# Patient Record
(demographics unavailable — no encounter records)

---

## 2024-12-13 NOTE — DISCUSSION/SUMMARY
[FreeTextEntry1] : Level 2 sonogram with cervical length at 20 weeks.   S/Sx of PTL reviewed.   No other interventions are indicated at this time.

## 2024-12-13 NOTE — DATA REVIEWED
[FreeTextEntry1] : Tracie presents to discuss her prior OB history and any implications for this pregnancy.   We discussed that a prior  delivery increases the incidence of a  birth in a subsequent pregnancy. We also discussed the GA at delivery being a factor as well as any issues that occured before delivery.   Given her delivery at 35+ weeks (late ), and an uneventful pregnancy prior to delivery, the circumstances immediately prior to labor certainly bear some responsibility. Tracie was noting a tremedous amount of stress and activity as she helped prepare for her sisters wedding, and then travelled by airplane to [place]. She subsequently was admitted to the hospital that day, and labor may have been precipitated by the travel/activity.   While we cannot 100% assign blame and causation, there is certainly a role for monitoring in this pregnancy.   Cervical length at the time of the level 2 will be beneficial to assign risk as well as followup evaluation of clinical circumstances change.

## 2025-06-20 NOTE — HISTORY OF PRESENT ILLNESS
[Complications:___] : complications include: [unfilled] [Delivery Date: ___] : on [unfilled] [Primary C/S] : delivered by  section [Female] : Delivery History: baby girl [Breastfeeding] : currently nursing [None] : No associated symptoms are reported [Clean/Dry/Intact] : clean, dry and intact [Cervix Sample Taken] : cervical sample not taken for a Pap smear [Not Done] : Examination of breasts not done [Doing Well] : is doing well [No Sign of Infection] : is showing no signs of infection [FreeTextEntry9] : Breech, unsuccessful ECV, Primary c/s.  Baby girl doing well. No signs of congentital hip problems. Bresatfeeding well. [de-identified] : Steri strips removed. [de-identified] : Limited bleeding, stable BP. Pain controlled.  [de-identified] : Discussed breastfeeding. Care of incision reviewed. RTO 5 wks. Will discuss contraception.

## 2025-07-18 NOTE — HISTORY OF PRESENT ILLNESS
[Postpartum Follow Up] : postpartum follow up [Complications:___] : no complications [Delivery Date: ___] : on [unfilled] [Primary C/S] : delivered by  section [Female] : Delivery History: baby girl [Wt. ___] : weighing [unfilled] [Breastfeeding] : currently nursing [S/Sx PP Depression] : no signs/symptoms of postpartum depression [Suicidal Ideation] : no suicidal ideation [Chills] : no chills [Fever] : no fever [Clean/Dry/Intact] : clean, dry and intact [Erythema] : erythematous [Swelling] : not swollen [Dehiscence] : not dehisced [Healed] : healed [Back to Normal] : is back to normal in size [___ wks] : is [unfilled] weeks in size [None] : no vaginal bleeding [Normal] : the vagina was normal [Cervix Sample Taken] : cervical sample not taken for a Pap smear [Examination Of The Breasts] : breasts are normal [Doing Well] : is doing well [No Sign of Infection] : is showing no signs of infection [FreeTextEntry9] : Primary c/s for Breech presentation.  [de-identified] : Combo feeding with formula occasionally.  [de-identified] : Redness in center of incision. No pain nor drainage. [de-identified] : 4 mm red raised but closed skin incision for 10mm of scar.  [de-identified] : Mild cellulitis [de-identified] : bacitracin to scar and cover each night for a week. If not better, RTO. Discussed Micronor, IUD or Nexplanon. Pt will use condoms now. Since she she combo feeding, she may ovulate sooner. Also encouraged PNV for supplementation while breastfeeding. RTO 4-5 months for annual